# Patient Record
Sex: FEMALE | Race: WHITE | NOT HISPANIC OR LATINO | URBAN - METROPOLITAN AREA
[De-identification: names, ages, dates, MRNs, and addresses within clinical notes are randomized per-mention and may not be internally consistent; named-entity substitution may affect disease eponyms.]

---

## 2017-04-19 ENCOUNTER — OFFICE VISIT (OUTPATIENT)
Dept: ADMISSIONS | Facility: MEDICAL CENTER | Age: 32
End: 2017-04-19
Attending: OBSTETRICS & GYNECOLOGY
Payer: COMMERCIAL

## 2017-04-22 NOTE — H&P
IDENTIFICATION:  This is a 32-year-old  3, para 2 with an EDC of   5/3/2017, EGA of 39 weeks who presents with a chief complaint of previous    x2, desires repeat .    HISTORY OF PRESENT ILLNESS:  This is a patient who was transferred to me from   [facility] with good prenatal care.  Prenatal care has been   uncomplicated.  She did have a beta strep UTI in October, which was treated.    She has otherwise had normal testing, normal ultrasound.  She is Rh positive,   rubella immune.  She declined a quad screen with this pregnancy.  She does   have a history of previous  x2.  She desires repeat  at this   time.  She has no other complaints.  No nausea, vomiting, fever, chills.  No   change in bowel or bladder habits.  Admits to good fetal movement.  Denies   symptoms of PIH, headaches, visual changes or epigastric pain.  Denies   spontaneous rupture of membranes or vaginal bleeding.  She is quite excited to   move forward with repeat low transverse  at this time.    OB HISTORY:  Significant for 2 previous  sections.    GYNECOLOGIC HISTORY:  Denies STDs, abnormal paps.    ALLERGIES:  None.    MEDICAL PROBLEMS:  None.    PAST SURGICAL HISTORY:   x2, laparoscopic cholecystectomy.    SOCIAL HISTORY:  She denies alcohol, tobacco or drug use.    FAMILY HISTORY:  Noncontributory.    REVIEW OF SYSTEMS:  Review of systems x12 is negative per AMA standards   available in chart.    LABORATORY DATA:  She declined her quad screen.  She is Rh positive, rubella   immune, GBS was positive.    PHYSICAL EXAMINATION:  VITAL SIGNS:  Patient is afebrile.  Vital signs within normal limits.  Fetal   heart tracings reactive, category I anticipated, she is not yosef.  GENERAL:  She is awake, alert, in no apparent distress.  NECK:  Supple.  No masses.  HEART:  Regular.  CHEST:  Clear.  BREASTS:  Symmetrical, without masses or discharge.  ABDOMEN:  Soft, pannus,  gravid and size appropriate for dates, nontender.    Positive bowel sounds x4.  EXTREMITIES:  No cyanosis, clubbing, trace edema, 2+ DTRs.  GYNECOLOGIC:  EGBUS within normal limits.  No perineal lesions.  Vagina is   pink and moist.  Cervix is midline without discharge or cervical motion   tenderness.  Uterus midline, anteverted.  No adnexal masses.    ASSESSMENT:  1.  At this time is pregnancy at term.  2.  Previous  x2, desires repeat  section.  3.  Beta strep positive urinary tract infection, treated in early pregnancy.    PLAN:  At this time, patient has been extensively counseled on risks,   benefits, complications, and alternatives to surgery which include but are not   limited to risk of anesthesia, risk of injury to bowel, bladder, ureters,   major vessels, nerves of the pelvis, as well as risk of blood clots in her   legs and lungs and subsequent postop pneumonia.  She accepts these risks and   plan subsequently is repeat low transverse .       ____________________________________     MD EMIL Stubbs / EMANI    DD:  2017 15:17:17  DT:  2017 18:14:43    D#:  [id number]  Job#:  [id number]

## 2017-04-22 NOTE — H&P
IDENTIFICATION:  This is a 32-year-old  3, para 2 with an EDC of   5/3/2017, EGA of 39 weeks who presents with a chief complaint of previous    x2, desires repeat .    HISTORY OF PRESENT ILLNESS:  This is a patient who was transferred to me from   [facility] with good prenatal care.  Prenatal care has been   uncomplicated.  She did have a beta strep UTI in October, which was treated.    She has otherwise had normal testing, normal ultrasound.  She is Rh positive,   rubella immune.  She declined a quad screen with this pregnancy.  She does   have a history of previous  x2.  She desires repeat  at this   time.  She has no other complaints.  No nausea, vomiting, fever, chills.  No   change in bowel or bladder habits.  Admits to good fetal movement.  Denies   symptoms of PIH, headaches, visual changes or epigastric pain.  Denies   spontaneous rupture of membranes or vaginal bleeding.  She is quite excited to   move forward with repeat low transverse  at this time.    OB HISTORY:  Significant for 2 previous  sections.    GYNECOLOGIC HISTORY:  Denies STDs, abnormal paps.    ALLERGIES:  None.    MEDICAL PROBLEMS:  None.    PAST SURGICAL HISTORY:   x2, laparoscopic cholecystectomy.    SOCIAL HISTORY:  She denies alcohol, tobacco or drug use.    FAMILY HISTORY:  Noncontributory.    REVIEW OF SYSTEMS:  Review of systems x12 is negative per AMA standards   available in chart.    LABORATORY DATA:  She declined her quad screen.  She is Rh positive, rubella   immune, GBS was positive.    PHYSICAL EXAMINATION:  VITAL SIGNS:  Patient is afebrile.  Vital signs within normal limits.  Fetal   heart tracings reactive, category I anticipated, she is not yosef.  GENERAL:  She is awake, alert, in no apparent distress.  NECK:  Supple.  No masses.  HEART:  Regular.  CHEST:  Clear.  BREASTS:  Symmetrical, without masses or discharge.  ABDOMEN:  Soft, pannus,  gravid and size appropriate for dates, nontender.    Positive bowel sounds x4.  EXTREMITIES:  No cyanosis, clubbing, trace edema, 2+ DTRs.  GYNECOLOGIC:  EGBUS within normal limits.  No perineal lesions.  Vagina is   pink and moist.  Cervix is midline without discharge or cervical motion   tenderness.  Uterus midline, anteverted.  No adnexal masses.    ASSESSMENT:  1.  At this time is pregnancy at term.  2.  Previous  x2, desires repeat  section.  3.  Beta strep positive urinary tract infection, treated in early pregnancy.    PLAN:  At this time, patient has been extensively counseled on risks,   benefits, complications, and alternatives to surgery which include but are not   limited to risk of anesthesia, risk of injury to bowel, bladder, ureters,   major vessels, nerves of the pelvis, as well as risk of blood clots in her   legs and lungs and subsequent postop pneumonia.  She accepts these risks and   plan subsequently is repeat low transverse .       ____________________________________     MD EMIL Stubbs / EMANI    DD:  2017 15:17:17  DT:  2017 18:14:43    D#:  [id number]  Job#:  [id number]

## 2017-04-28 ENCOUNTER — HOSPITAL ENCOUNTER (INPATIENT)
Facility: MEDICAL CENTER | Age: 32
LOS: 2 days | End: 2017-04-30
Attending: OBSTETRICS & GYNECOLOGY | Admitting: OBSTETRICS & GYNECOLOGY
Payer: COMMERCIAL

## 2017-04-28 LAB
BASOPHILS # BLD AUTO: 0.2 % (ref 0–1.8)
BASOPHILS # BLD: 0.02 K/UL (ref 0–0.12)
EOSINOPHIL # BLD AUTO: 0.09 K/UL (ref 0–0.51)
EOSINOPHIL NFR BLD: 0.8 % (ref 0–6.9)
ERYTHROCYTE [DISTWIDTH] IN BLOOD BY AUTOMATED COUNT: 51.7 FL (ref 35.9–50)
HCT VFR BLD AUTO: 38.4 % (ref 37–47)
HGB BLD-MCNC: 12.3 G/DL (ref 12–16)
HOLDING TUBE BB 8507: NORMAL
IMM GRANULOCYTES # BLD AUTO: 0.06 K/UL (ref 0–0.11)
IMM GRANULOCYTES NFR BLD AUTO: 0.6 % (ref 0–0.9)
LYMPHOCYTES # BLD AUTO: 2 K/UL (ref 1–4.8)
LYMPHOCYTES NFR BLD: 18.9 % (ref 22–41)
MCH RBC QN AUTO: 29.4 PG (ref 27–33)
MCHC RBC AUTO-ENTMCNC: 32 G/DL (ref 33.6–35)
MCV RBC AUTO: 91.9 FL (ref 81.4–97.8)
MONOCYTES # BLD AUTO: 0.6 K/UL (ref 0–0.85)
MONOCYTES NFR BLD AUTO: 5.7 % (ref 0–13.4)
NEUTROPHILS # BLD AUTO: 7.82 K/UL (ref 2–7.15)
NEUTROPHILS NFR BLD: 73.8 % (ref 44–72)
NRBC # BLD AUTO: 0 K/UL
NRBC BLD AUTO-RTO: 0 /100 WBC
PLATELET # BLD AUTO: 166 K/UL (ref 164–446)
PMV BLD AUTO: 12.6 FL (ref 9–12.9)
RBC # BLD AUTO: 4.18 M/UL (ref 4.2–5.4)
WBC # BLD AUTO: 10.6 K/UL (ref 4.8–10.8)

## 2017-04-28 PROCEDURE — 4A1HXCZ MONITORING OF PRODUCTS OF CONCEPTION, CARDIAC RATE, EXTERNAL APPROACH: ICD-10-PCS | Performed by: OBSTETRICS & GYNECOLOGY

## 2017-04-28 PROCEDURE — 770002 HCHG ROOM/CARE - OB PRIVATE (112)

## 2017-04-28 PROCEDURE — 700102 HCHG RX REV CODE 250 W/ 637 OVERRIDE(OP): Performed by: ANESTHESIOLOGY

## 2017-04-28 PROCEDURE — 305385 HCHG SURGICAL SERVICES 1/4 HOUR

## 2017-04-28 PROCEDURE — 700111 HCHG RX REV CODE 636 W/ 250 OVERRIDE (IP): Performed by: OBSTETRICS & GYNECOLOGY

## 2017-04-28 PROCEDURE — 304964 HCHG RECOVERY ROOM TIME 1HR

## 2017-04-28 PROCEDURE — 306288 HCHG RETRACTOR C SECTION LG

## 2017-04-28 PROCEDURE — 36415 COLL VENOUS BLD VENIPUNCTURE: CPT

## 2017-04-28 PROCEDURE — 700111 HCHG RX REV CODE 636 W/ 250 OVERRIDE (IP): Performed by: ANESTHESIOLOGY

## 2017-04-28 PROCEDURE — 302128 INFUSION PUMP W/POLE: Performed by: OBSTETRICS & GYNECOLOGY

## 2017-04-28 PROCEDURE — 85025 COMPLETE CBC W/AUTO DIFF WBC: CPT

## 2017-04-28 PROCEDURE — 306828 HCHG ANES-TIME GENERAL: Performed by: OBSTETRICS & GYNECOLOGY

## 2017-04-28 PROCEDURE — 700102 HCHG RX REV CODE 250 W/ 637 OVERRIDE(OP): Performed by: OBSTETRICS & GYNECOLOGY

## 2017-04-28 PROCEDURE — 700111 HCHG RX REV CODE 636 W/ 250 OVERRIDE (IP)

## 2017-04-28 PROCEDURE — 59514 CESAREAN DELIVERY ONLY: CPT

## 2017-04-28 PROCEDURE — A9270 NON-COVERED ITEM OR SERVICE: HCPCS | Performed by: OBSTETRICS & GYNECOLOGY

## 2017-04-28 PROCEDURE — A9270 NON-COVERED ITEM OR SERVICE: HCPCS | Performed by: ANESTHESIOLOGY

## 2017-04-28 RX ORDER — KETOROLAC TROMETHAMINE 30 MG/ML
30 INJECTION, SOLUTION INTRAMUSCULAR; INTRAVENOUS EVERY 6 HOURS
Status: DISCONTINUED | OUTPATIENT
Start: 2017-04-28 | End: 2017-04-28

## 2017-04-28 RX ORDER — KETOROLAC TROMETHAMINE 30 MG/ML
30 INJECTION, SOLUTION INTRAMUSCULAR; INTRAVENOUS EVERY 6 HOURS
Status: COMPLETED | OUTPATIENT
Start: 2017-04-28 | End: 2017-04-29

## 2017-04-28 RX ORDER — METOCLOPRAMIDE HYDROCHLORIDE 5 MG/ML
10 INJECTION INTRAMUSCULAR; INTRAVENOUS EVERY 6 HOURS PRN
Status: DISCONTINUED | OUTPATIENT
Start: 2017-04-28 | End: 2017-04-29

## 2017-04-28 RX ORDER — CEFAZOLIN SODIUM 1 G/3ML
1 INJECTION, POWDER, FOR SOLUTION INTRAMUSCULAR; INTRAVENOUS ONCE
Status: DISCONTINUED | OUTPATIENT
Start: 2017-04-28 | End: 2017-04-28 | Stop reason: HOSPADM

## 2017-04-28 RX ORDER — ONDANSETRON 2 MG/ML
4 INJECTION INTRAMUSCULAR; INTRAVENOUS ONCE
Status: DISCONTINUED | OUTPATIENT
Start: 2017-04-28 | End: 2017-04-29

## 2017-04-28 RX ORDER — SODIUM CHLORIDE, SODIUM LACTATE, POTASSIUM CHLORIDE, CALCIUM CHLORIDE 600; 310; 30; 20 MG/100ML; MG/100ML; MG/100ML; MG/100ML
INJECTION, SOLUTION INTRAVENOUS CONTINUOUS
Status: DISCONTINUED | OUTPATIENT
Start: 2017-04-28 | End: 2017-04-28 | Stop reason: HOSPADM

## 2017-04-28 RX ORDER — METOCLOPRAMIDE HYDROCHLORIDE 5 MG/ML
10 INJECTION INTRAMUSCULAR; INTRAVENOUS ONCE
Status: COMPLETED | OUTPATIENT
Start: 2017-04-28 | End: 2017-04-28

## 2017-04-28 RX ORDER — ONDANSETRON 2 MG/ML
4 INJECTION INTRAMUSCULAR; INTRAVENOUS EVERY 6 HOURS PRN
Status: DISCONTINUED | OUTPATIENT
Start: 2017-04-28 | End: 2017-04-29

## 2017-04-28 RX ORDER — DIPHENHYDRAMINE HYDROCHLORIDE 50 MG/ML
25 INJECTION INTRAMUSCULAR; INTRAVENOUS EVERY 6 HOURS PRN
Status: DISCONTINUED | OUTPATIENT
Start: 2017-04-28 | End: 2017-04-29

## 2017-04-28 RX ORDER — OXYCODONE HYDROCHLORIDE AND ACETAMINOPHEN 5; 325 MG/1; MG/1
1 TABLET ORAL EVERY 4 HOURS PRN
Status: DISCONTINUED | OUTPATIENT
Start: 2017-04-28 | End: 2017-04-29

## 2017-04-28 RX ORDER — SODIUM CHLORIDE, SODIUM LACTATE, POTASSIUM CHLORIDE, CALCIUM CHLORIDE 600; 310; 30; 20 MG/100ML; MG/100ML; MG/100ML; MG/100ML
INJECTION, SOLUTION INTRAVENOUS PRN
Status: DISCONTINUED | OUTPATIENT
Start: 2017-04-28 | End: 2017-04-30 | Stop reason: HOSPADM

## 2017-04-28 RX ORDER — SIMETHICONE 80 MG
80 TABLET,CHEWABLE ORAL 4 TIMES DAILY PRN
Status: DISCONTINUED | OUTPATIENT
Start: 2017-04-28 | End: 2017-04-30 | Stop reason: HOSPADM

## 2017-04-28 RX ORDER — SODIUM CHLORIDE, SODIUM LACTATE, POTASSIUM CHLORIDE, CALCIUM CHLORIDE 600; 310; 30; 20 MG/100ML; MG/100ML; MG/100ML; MG/100ML
1500 INJECTION, SOLUTION INTRAVENOUS ONCE
Status: COMPLETED | OUTPATIENT
Start: 2017-04-28 | End: 2017-04-28

## 2017-04-28 RX ORDER — DOCUSATE SODIUM 100 MG/1
100 CAPSULE, LIQUID FILLED ORAL 2 TIMES DAILY PRN
Status: DISCONTINUED | OUTPATIENT
Start: 2017-04-28 | End: 2017-04-30 | Stop reason: HOSPADM

## 2017-04-28 RX ORDER — DIPHENHYDRAMINE HYDROCHLORIDE 50 MG/ML
12.5 INJECTION INTRAMUSCULAR; INTRAVENOUS EVERY 6 HOURS PRN
Status: DISCONTINUED | OUTPATIENT
Start: 2017-04-28 | End: 2017-04-29

## 2017-04-28 RX ORDER — OXYCODONE HCL 5 MG/5 ML
10 SOLUTION, ORAL ORAL
Status: COMPLETED | OUTPATIENT
Start: 2017-04-28 | End: 2017-04-28

## 2017-04-28 RX ORDER — OXYCODONE AND ACETAMINOPHEN 10; 325 MG/1; MG/1
1 TABLET ORAL EVERY 4 HOURS PRN
Status: DISCONTINUED | OUTPATIENT
Start: 2017-04-28 | End: 2017-04-29

## 2017-04-28 RX ORDER — KETOROLAC TROMETHAMINE 30 MG/ML
30 INJECTION, SOLUTION INTRAMUSCULAR; INTRAVENOUS EVERY 6 HOURS
Status: DISCONTINUED | OUTPATIENT
Start: 2017-04-29 | End: 2017-04-28

## 2017-04-28 RX ORDER — NALOXONE HYDROCHLORIDE 0.4 MG/ML
0.1 INJECTION, SOLUTION INTRAMUSCULAR; INTRAVENOUS; SUBCUTANEOUS PRN
Status: DISCONTINUED | OUTPATIENT
Start: 2017-04-28 | End: 2017-04-29

## 2017-04-28 RX ORDER — VITAMIN A ACETATE, BETA CAROTENE, ASCORBIC ACID, CHOLECALCIFEROL, .ALPHA.-TOCOPHEROL ACETATE, DL-, THIAMINE MONONITRATE, RIBOFLAVIN, NIACINAMIDE, PYRIDOXINE HYDROCHLORIDE, FOLIC ACID, CYANOCOBALAMIN, CALCIUM CARBONATE, FERROUS FUMARATE, ZINC OXIDE, CUPRIC OXIDE 3080; 12; 120; 400; 1; 1.84; 3; 20; 22; 920; 25; 200; 27; 10; 2 [IU]/1; UG/1; MG/1; [IU]/1; MG/1; MG/1; MG/1; MG/1; MG/1; [IU]/1; MG/1; MG/1; MG/1; MG/1; MG/1
1 TABLET, FILM COATED ORAL EVERY MORNING
Status: DISCONTINUED | OUTPATIENT
Start: 2017-04-28 | End: 2017-04-30 | Stop reason: HOSPADM

## 2017-04-28 RX ORDER — MISOPROSTOL 200 UG/1
600 TABLET ORAL
Status: DISCONTINUED | OUTPATIENT
Start: 2017-04-28 | End: 2017-04-30 | Stop reason: HOSPADM

## 2017-04-28 RX ADMIN — KETOROLAC TROMETHAMINE 30 MG: 30 INJECTION, SOLUTION INTRAMUSCULAR at 14:37

## 2017-04-28 RX ADMIN — SODIUM CITRATE AND CITRIC ACID MONOHYDRATE 30 ML: 500; 334 SOLUTION ORAL at 07:26

## 2017-04-28 RX ADMIN — SODIUM CHLORIDE, POTASSIUM CHLORIDE, SODIUM LACTATE AND CALCIUM CHLORIDE: 600; 310; 30; 20 INJECTION, SOLUTION INTRAVENOUS at 06:43

## 2017-04-28 RX ADMIN — FAMOTIDINE 20 MG: 10 INJECTION, SOLUTION INTRAVENOUS at 07:27

## 2017-04-28 RX ADMIN — OXYCODONE HYDROCHLORIDE AND ACETAMINOPHEN 1 TABLET: 10; 325 TABLET ORAL at 20:48

## 2017-04-28 RX ADMIN — OXYCODONE HYDROCHLORIDE 10 MG: 5 SOLUTION ORAL at 09:40

## 2017-04-28 RX ADMIN — METOCLOPRAMIDE 10 MG: 5 INJECTION, SOLUTION INTRAMUSCULAR; INTRAVENOUS at 07:27

## 2017-04-28 RX ADMIN — Medication 1 TABLET: at 11:48

## 2017-04-28 RX ADMIN — SODIUM CHLORIDE, POTASSIUM CHLORIDE, SODIUM LACTATE AND CALCIUM CHLORIDE 1000 ML: 600; 310; 30; 20 INJECTION, SOLUTION INTRAVENOUS at 07:27

## 2017-04-28 RX ADMIN — HYDROMORPHONE HYDROCHLORIDE 0.4 MG: 1 INJECTION, SOLUTION INTRAMUSCULAR; INTRAVENOUS; SUBCUTANEOUS at 11:47

## 2017-04-28 RX ADMIN — SODIUM CHLORIDE, POTASSIUM CHLORIDE, SODIUM LACTATE AND CALCIUM CHLORIDE: 600; 310; 30; 20 INJECTION, SOLUTION INTRAVENOUS at 06:05

## 2017-04-28 RX ADMIN — KETOROLAC TROMETHAMINE 30 MG: 30 INJECTION, SOLUTION INTRAMUSCULAR at 20:48

## 2017-04-28 ASSESSMENT — PAIN SCALES - GENERAL
PAINLEVEL_OUTOF10: 0
PAINLEVEL_OUTOF10: 0
PAINLEVEL_OUTOF10: 4
PAINLEVEL_OUTOF10: 0
PAINLEVEL_OUTOF10: 4
PAINLEVEL_OUTOF10: 0
PAINLEVEL_OUTOF10: 1
PAINLEVEL_OUTOF10: 4
PAINLEVEL_OUTOF10: 7
PAINLEVEL_OUTOF10: 7

## 2017-04-28 ASSESSMENT — LIFESTYLE VARIABLES
DO YOU DRINK ALCOHOL: NO
EVER_SMOKED: NEVER
ALCOHOL_USE: NO
DO YOU DRINK ALCOHOL: NO

## 2017-04-28 ASSESSMENT — PATIENT HEALTH QUESTIONNAIRE - PHQ9
SUM OF ALL RESPONSES TO PHQ9 QUESTIONS 1 AND 2: 0
2. FEELING DOWN, DEPRESSED, IRRITABLE, OR HOPELESS: NOT AT ALL
1. LITTLE INTEREST OR PLEASURE IN DOING THINGS: NOT AT ALL
SUM OF ALL RESPONSES TO PHQ QUESTIONS 1-9: 0

## 2017-04-28 ASSESSMENT — COPD QUESTIONNAIRES
COPD SCREENING SCORE: 0
HAVE YOU SMOKED AT LEAST 100 CIGARETTES IN YOUR ENTIRE LIFE: NO/DON'T KNOW
DURING THE PAST 4 WEEKS HOW MUCH DID YOU FEEL SHORT OF BREATH: NONE/LITTLE OF THE TIME
DO YOU EVER COUGH UP ANY MUCUS OR PHLEGM?: NO/ONLY WITH OCCASIONAL COLDS OR INFECTIONS

## 2017-04-28 NOTE — IP AVS SNAPSHOT
4/30/2017    Amita Jacome  [address]  [place] [address]    Dear Amita:    [facility] wants to ensure your discharge home is safe and you or your loved ones have had all of your questions answered regarding your care after you leave the hospital.    Below is a list of resources and contact information should you have any questions regarding your hospital stay, follow-up instructions, or active medical symptoms.    Questions or Concerns Regarding… Contact   Medical Questions Related to Your Discharge  (7 days a week, 8am-5pm) Contact a Nurse Care Coordinator   [phone]   Medical Questions Not Related to Your Discharge  (24 hours a day / 7 days a week)  Contact the Nurse Health Line   [phone]    Medications or Discharge Instructions Refer to your discharge packet   or contact your [facility] Primary Care Provider   [phone]   Follow-up Appointment(s) Schedule your appointment via [facility]   or contact Scheduling [phone]   Billing Review your statement via [employer]  or contact Billing [phone]   Medical Records Review your records via Her[employer]   or contact Medical Records [phone]     You may receive a telephone call within two days of discharge. This call is to make certain you understand your discharge instructions and have the opportunity to have any questions answered. You can also easily access your medical information, test results and upcoming appointments via the [facility] free online health management tool. You can learn more and sign up at [email or web address]. For assistance setting up your Her[facility] account, please call [phone].    Once again, we want to ensure your discharge home is safe and that you have a clear understanding of any next steps in your care. If you have any questions or concerns, please do not hesitate to contact us, we are here for you. Thank you for choosing [facility] for your healthcare needs.    Sincerely,    Your [facility] Healthcare Team

## 2017-04-28 NOTE — OP REPORT
DATE OF SERVICE:  2017    PREOPERATIVE DIAGNOSES:  1.  Pregnancy at term.  2.  Previous .  3.  Desires repeat  section.    POSTOPERATIVE DIAGNOSES:  1.  Pregnancy at term.  2.  Previous .  3.  Desires repeat  section.  4.  Double nuchal cord loose.    PROCEDURE:  Repeat low transverse .    SURGEON:  Robert Fritz MD    ASSISTANT:  Dr. May.    ANESTHESIOLOGIST:  Dayday Carias MD    ANESTHESIA:  Spinal.    SPECIMENS:  Placenta.    ESTIMATED BLOOD LOSS:  550 mL.    FINDINGS:  1.  Normal appearing uterus, tubes, ovaries.  2.  Male infant with Apgars of 8 and 9 and loose double nuchal cord.    COMPLICATIONS:  None.    DISPOSITION:  Stable.    PROCEDURE IN DETAIL:  After informed consent was obtained, the patient was   taken to the operating room where spinal anesthesia was found to be adequate.    She was then prepped and draped in normal sterile fashion in dorsal supine   position with leftward tilt.  Pfannenstiel skin incision was made through her   previous incision, carried down to the underlying layer of fascia with the   Bovie.  The fascia was then nicked in the midline and the fascial incision was   extended laterally with the Bovie.  The inferior layer of fascial incision   was then grasped with 2 Kocher clamps, tented up, and the underlying layer of   rectus muscle dissected off with the Bovie.  Attention was then turned to the   superior layer of fascial incision, which in a similar fashion was grasped   with 2 Kocher clamps, tented up, and the underlying layer of rectus muscles   were dissected off with Bovie.  The rectus muscles were  in the   midline.  The peritoneum identified, tented up and entered sharply with a   scalpel.  The peritoneal incision was extended superiorly and inferiorly with   good visualization of the bladder.  The uterus was then examined for position.    An Rogelio O was placed.  The bowel swept away.  The Rogelio O was  secured.  A   bladder flap was created with Metzenbaums.  The uterus was then incised in a   low U-shaped fashion with a scalpel.  The uterine incision was extended   laterally bluntly.  Clear fluid was noted.  The head was delivered   atraumatically.  The nares and mouth were bulb suctioned.  Loose double nuchal   was reduced.  The shoulders and body followed without complication.  The cord   was clamped and cut.  Cord gases were collected and held.  The infant was   handed off to awaiting respiratory team.  At this point, the placenta   delivered intact with 3-vessel cord and trailing membranes were gently teased   out.  The uterus was cleared of all clots and debris.  The uterine incision   was closed with 0 Chromic in a running locking fashion.  Excellent hemostasis   was noted.  Tubes and ovaries were noted to be normal bilaterally.  Again,   irrigation was performed and hemostasis was assured.  The Rogelio O was   removed.  Two omental adhesions were taken down with the Bovie.  Hemostasis   was assured.  The peritoneum was closed with 2-0 Vicryl in a running fashion.    Irrigation was performed and hemostasis was assured.  The fascia was closed   with 0 Vicryl in a running fashion.  Irrigation was performed and hemostasis   was assured.  The subcutaneous fat was reapproximated with a running stitch of   3-0 Vicryl.  The skin was closed with 4-0 Vicryl in a subcuticular stitch.    Patient tolerated the procedure well.  Sponge, lap and needle counts were   correct x3.  Patient and baby were taken to recovery room in stable condition.       ____________________________________     MD EMIL Stubbs / EMANI    DD:  04/28/2017 08:36:40  DT:  04/28/2017 08:56:08    D#:  [id number]  Job#:  [id number]

## 2017-04-28 NOTE — IP AVS SNAPSHOT
Home Care Instructions                                                                                                                Amita Jacome   MRN: [id number]    Department:  POST PARTUM [facility]   2017           Follow-up Information     1. Follow up with Robert Lindsey M.D. In 2 weeks.    Specialty:  OB/Gyn    Contact information    [address]  [facility] [address]  [place] [address]  [phone]         I assume responsibility for securing a follow-up  Screening blood test on my baby within the specified date range.    -                  Discharge Instructions       POSTPARTUM DISCHARGE INSTRUCTIONS FOR MOM    YOB: 1985   Age: 32 y.o.               Admit Date: 2017     Discharge Date: 2017  Attending Doctor:  Robert Lindsey M.D.                  Allergies:  [place]    Discharged to home by car. Discharged via wheelchair, hospital escort: Yes.  Special equipment needed: Not Applicable  Belongings with: Personal  Be sure to schedule a follow-up appointment with your primary care doctor or any specialists as instructed.     Discharge Plan:   Diet Plan: Discussed  Activity Level: Discussed  Confirmed Follow up Appointment: Appointment Scheduled  Confirmed Symptoms Management: Discussed  Medication Reconciliation Updated: Yes  Influenza Vaccine Indication: Indicated: Not available from distributor/    REASONS TO CALL YOUR OBSTETRICIAN:  1.   Persistent fever or shaking chills (Temperature higher than 100.4)  2.   Heavy bleeding (soaking more than 1 pad per hour); Passing clots  3.   Foul odor from vagina  4.   Mastitis (Breast infection; breast pain, chills, fever, redness)  5.   Urinary pain, burning or frequency  6.   Episiotomy infection  7.   Abdominal incision infection  8.   Severe depression longer than 24 hours    HAND WASHING  · Prior to handling the baby.  · Before breastfeeding or bottle feeding baby.  · After using the bathroom or changing the baby's  "diaper.    WOUND CARE  Ask your physician for additional care instructions.  In general:    ·  Incision:      · Keep clean and dry.    · Do NOT lift anything heavier than your baby for up to 6 weeks.    · There should not be any opening or pus.      VAGINAL CARE  · Nothing inside vagina for 6 weeks: no sexual intercourse, tampons or douching.  · Bleeding may continue for 2-4 weeks.  Amount may vary.    · Call your physician for heavy bleeding which means soaking more than 1 pad per hour    BIRTH CONTROL  · It is possible to become pregnant at any time after delivery and while breastfeeding.  · Plan to discuss a method of birth control with your physician at your follow up visit. visit.    DIET AND ELIMINATION  · Eating more fiber (bran cereal, fruits, and vegetables) and drinking plenty of fluids will help to avoid constipation.  · Urinary frequency after childbirth is normal.    POSTPARTUM BLUES  During the first few days after birth, you may experience a sense of the \"blues\" which may include impatience, irritability or even crying.  These feeling come and go quickly.  However, as many as 1 in 10 women experience emotional symptoms known as postpartum depression.    Postpartum depression:  May start as early as the second or third day after delivery or take several weeks or months to develop.  Symptoms of \"blues\" are present, but are more intense:  Crying spells; loss of appetite; feelings of hopelessness or loss of control; fear of touching the baby; over concern or no concern at all about the baby; little or no concern about your own appearance/caring for yourself; and/or inability to sleep or excessive sleeping.  Contact your physician if you are experiencing any of these symptoms.    Crisis Hotline:  · [place] Crisis Hotline:  4-734-YEPCHKJ  Or 1-985.101.2162  · [place] Crisis Hotline:  1-739.230.4517  Or 399-639-9048    PREVENTING SHAKEN BABY:  If you are angry or stressed, PUT THE BABY IN THE CRIB, " "step away, take some deep breaths, and wait until you are calm to care for the baby.  DO NOT SHAKE THE BABY.  You are not alone, call a supporter for help.    · Crisis Call Center 24/7 crisis line 468-038-2861 or 1-530.313.5589  · You can also text them, text \"ANSWER\" to 740155    QUIT SMOKING/TOBACCO USE:  I understand the use of any tobacco products increases my chance of suffering from future heart disease and could cause other illnesses which may shorten my life. Quitting the use of tobacco products is the single most important thing I can do to improve my health. For further information on smoking / tobacco cessation call a Toll Free Quit Line at 1-907.692.4030 (*National Cancer [employer]essel) or 1-394.420.3652 (American Lung Association) or you can access the web based program at www.lungusa.org.    · [place] Tobacco Users Help Line:  (603) 308-2256       Toll Free: 1-709.268.9567  · Quit Tobacco Program [facility] (005)159-4297    DEPRESSION / SUICIDE RISK:  As you are discharged from this [facility], it is important to learn how to keep safe from harming yourself.    Recognize the warning signs:  · Abrupt changes in personality, positive or negative- including increase in energy   · Giving away possessions  · Change in eating patterns- significant weight changes-  positive or negative  · Change in sleeping patterns- unable to sleep or sleeping all the time   · Unwillingness or inability to communicate  · Depression  · Unusual sadness, discouragement and loneliness  · Talk of wanting to die  · Neglect of personal appearance   · Rebelliousness- reckless behavior  · Withdrawal from people/activities they love  · Confusion- inability to concentrate     If you or a loved one observes any of these behaviors or has concerns about self-harm, here's what you can do:  · Talk about it- your feelings and reasons for harming yourself  · Remove any means that you might use to hurt yourself " (examples: pills, rope, extension cords, firearm)  · Get professional help from the community (Mental Health, Substance Abuse, psychological counseling)  · Do not be alone:Call your Safe Contact- someone whom you trust who will be there for you.  · Call your local CRISIS HOTLINE 519-8328 or 480-499-6832  · Call your local Children's Mobile Crisis Response Team [place] (231) 979-8071 or www.[employer]  · Call the toll free National Suicide Prevention Hotlines   · National Suicide Prevention Lifeline 155-555-SIQB (3730)  · National Hope Line Network 800-SUICIDE (820-1362)    DISCHARGE SURVEY:  Thank you for choosing [facility].  We hope we provided you with very good care.  You may be receiving a survey in the mail.  Please fill it out.  Your opinion is valuable to us.    ADDITIONAL EDUCATIONAL MATERIALS GIVEN TO PATIENT:        My signature on this form indicates that:  1.  I have reviewed and understand the above information  2.  My questions regarding this information have been answered to my satisfaction.  3.  I have formulated a plan with my discharge nurse to obtain my prescribed medication for home.         Discharge Medication Instructions:    Below are the medications your physician expects you to take upon discharge:    Review all your home medications and newly ordered medications with your doctor and/or pharmacist. Follow medication instructions as directed by your doctor and/or pharmacist.    Please keep your medication list with you and share with your physician.               Medication List      START taking these medications        Instructions    Morning Afternoon Evening Bedtime     MG Caps   Last time this was given:  100 mg on 4/30/2017  1:31 AM        Take 100 mg by mouth 2 times a day as needed for Constipation.   Dose:  100 mg                        ibuprofen 600 MG Tabs   Last time this was given:  600 mg on 4/30/2017  5:34 AM   Commonly known as:  MOTRIN        Take 1  Tab by mouth every 6 hours as needed (For cramping after delivery; do not give if patient is receiving ketorolac (Toradol)).   Dose:  600 mg                        oxycodone-acetaminophen 5-325 MG Tabs   Last time this was given:  1 Tab on 4/29/2017  4:48 PM   Commonly known as:  PERCOCET        Take 1 Tab by mouth every four hours as needed (for Moderate Pain (Pain Scale 4-6) after delivery).   Dose:  1 Tab                          CONTINUE taking these medications        Instructions    Morning Afternoon Evening Bedtime    ONE-A-DAY WOMENS PRENATAL PO        Take 1 Tab by mouth every day.   Dose:  1 Tab                             Where to Get Your Medications      Information about where to get these medications is not yet available     ! Ask your nurse or doctor about these medications    -  MG Caps  - ibuprofen 600 MG Tabs  - oxycodone-acetaminophen 5-325 MG Tabs            Crisis Hotline:     [place] Crisis Hotline:  8-774-CGSDIVN or 1-583.352.2170    [place] Crisis Hotline:    1-537.983.1955 or 159-482-9724        Disclaimer           _____________________________________                     __________       ________       Patient/Mother Signature or Legal                          Date                   Time

## 2017-04-28 NOTE — OR SURGEON
Immediate Post-Operative Note      PreOp Diagnosis: iup at term; prev csx; desired rltcs    PostOp Diagnosis: same and double nuchal    Procedure(s):  REPEAT C SECTION - Wound Class: Clean Contaminated    Surgeon(s):  YUNG Randolph M.D.    Anesthesiologist/Type of Anesthesia:  Anesthesiologist: Dayday Carias M.D./Spinal    Surgical Staff:  Circulator: Indiana Pabon R.N.  Scrub Person: Jae Berumen  L&RADHA Baby  Nurse: Lizzy Panda R.N.    Specimen: placentae    Estimated Blood Loss: 550      Findings: wnl u/t/ov; male 8/9 double nuchal loose    Complications: none        4/28/2017 8:32 AM Robert Lindsey

## 2017-04-28 NOTE — IP AVS SNAPSHOT
Novawise Access Code: [email or web address]  Expires: 5/19/2017 11:18 AM    Novawise  A secure, online tool to manage your health information     [employer]’s Novawise® is a secure, online tool that connects you to your personalized health information from the privacy of your home -- day or night - making it very easy for you to manage your healthcare. Once the activation process is completed, you can even access your medical information using the Novawise elizabeth, which is available for free in the Apple Elizabeth store or Google Play store.     Novawise provides the following levels of access (as shown below):   My Chart Features   [facility] Primary Care Doctor [facility]  Specialists [facility]  Urgent  Care Non-[facility]  Primary Care  Doctor   Email your healthcare team securely and privately 24/7 X X X X   Manage appointments: schedule your next appointment; view details of past/upcoming appointments X      Request prescription refills. X      View recent personal medical records, including lab and immunizations X X X X   View health record, including health history, allergies, medications X X X X   Read reports about your outpatient visits, procedures, consult and ER notes X X X X   See your discharge summary, which is a recap of your hospital and/or ER visit that includes your diagnosis, lab results, and care plan. X X       How to register for Novawise:  1. Go to  [email or web address].  2. Click on the Sign Up Now box, which takes you to the New Member Sign Up page. You will need to provide the following information:  a. Enter your Novawise Access Code exactly as it appears at the top of this page. (You will not need to use this code after you’ve completed the sign-up process. If you do not sign up before the expiration date, you must request a new code.)   b. Enter your date of birth.   c. Enter your home email address.   d. Click Submit, and follow the next screen’s instructions.  3. Create a Novawise ID. This will be your Novawise  login ID and cannot be changed, so think of one that is secure and easy to remember.  4. Create a [employer] password. You can change your password at any time.  5. Enter your Password Reset Question and Answer. This can be used at a later time if you forget your password.   6. Enter your e-mail address. This allows you to receive e-mail notifications when new information is available in [employer].  7. Click Sign Up. You can now view your health information.    For assistance activating your [employer] account, call [phone]

## 2017-04-29 LAB
ERYTHROCYTE [DISTWIDTH] IN BLOOD BY AUTOMATED COUNT: 52.5 FL (ref 35.9–50)
HCT VFR BLD AUTO: 31.9 % (ref 37–47)
HGB BLD-MCNC: 10 G/DL (ref 12–16)
MCH RBC QN AUTO: 29.4 PG (ref 27–33)
MCHC RBC AUTO-ENTMCNC: 31.3 G/DL (ref 33.6–35)
MCV RBC AUTO: 93.8 FL (ref 81.4–97.8)
PLATELET # BLD AUTO: 122 K/UL (ref 164–446)
PMV BLD AUTO: 12.2 FL (ref 9–12.9)
RBC # BLD AUTO: 3.4 M/UL (ref 4.2–5.4)
WBC # BLD AUTO: 11.9 K/UL (ref 4.8–10.8)

## 2017-04-29 PROCEDURE — 36415 COLL VENOUS BLD VENIPUNCTURE: CPT

## 2017-04-29 PROCEDURE — 700112 HCHG RX REV CODE 229: Performed by: OBSTETRICS & GYNECOLOGY

## 2017-04-29 PROCEDURE — A9270 NON-COVERED ITEM OR SERVICE: HCPCS | Performed by: OBSTETRICS & GYNECOLOGY

## 2017-04-29 PROCEDURE — 700102 HCHG RX REV CODE 250 W/ 637 OVERRIDE(OP): Performed by: ANESTHESIOLOGY

## 2017-04-29 PROCEDURE — 770002 HCHG ROOM/CARE - OB PRIVATE (112)

## 2017-04-29 PROCEDURE — A9270 NON-COVERED ITEM OR SERVICE: HCPCS | Performed by: ANESTHESIOLOGY

## 2017-04-29 PROCEDURE — 700111 HCHG RX REV CODE 636 W/ 250 OVERRIDE (IP): Performed by: ANESTHESIOLOGY

## 2017-04-29 PROCEDURE — 700102 HCHG RX REV CODE 250 W/ 637 OVERRIDE(OP): Performed by: OBSTETRICS & GYNECOLOGY

## 2017-04-29 PROCEDURE — 85027 COMPLETE CBC AUTOMATED: CPT

## 2017-04-29 RX ORDER — KETOROLAC TROMETHAMINE 30 MG/ML
30 INJECTION, SOLUTION INTRAMUSCULAR; INTRAVENOUS EVERY 6 HOURS
Status: DISCONTINUED | OUTPATIENT
Start: 2017-04-29 | End: 2017-04-29 | Stop reason: ALTCHOICE

## 2017-04-29 RX ORDER — IBUPROFEN 600 MG/1
600 TABLET ORAL EVERY 6 HOURS PRN
Status: DISCONTINUED | OUTPATIENT
Start: 2017-04-29 | End: 2017-04-30 | Stop reason: HOSPADM

## 2017-04-29 RX ORDER — IBUPROFEN 600 MG/1
600 TABLET ORAL EVERY 6 HOURS PRN
Status: DISCONTINUED | OUTPATIENT
Start: 2017-04-30 | End: 2017-04-29

## 2017-04-29 RX ORDER — MORPHINE SULFATE 4 MG/ML
4 INJECTION, SOLUTION INTRAMUSCULAR; INTRAVENOUS
Status: DISCONTINUED | OUTPATIENT
Start: 2017-04-29 | End: 2017-04-30 | Stop reason: HOSPADM

## 2017-04-29 RX ORDER — OXYCODONE HYDROCHLORIDE AND ACETAMINOPHEN 5; 325 MG/1; MG/1
1 TABLET ORAL EVERY 4 HOURS PRN
Status: DISCONTINUED | OUTPATIENT
Start: 2017-04-29 | End: 2017-04-30 | Stop reason: HOSPADM

## 2017-04-29 RX ORDER — DIPHENHYDRAMINE HYDROCHLORIDE 50 MG/ML
25 INJECTION INTRAMUSCULAR; INTRAVENOUS EVERY 6 HOURS PRN
Status: DISCONTINUED | OUTPATIENT
Start: 2017-04-29 | End: 2017-04-30 | Stop reason: HOSPADM

## 2017-04-29 RX ORDER — ONDANSETRON 4 MG/1
4 TABLET, ORALLY DISINTEGRATING ORAL EVERY 6 HOURS PRN
Status: DISCONTINUED | OUTPATIENT
Start: 2017-04-29 | End: 2017-04-30 | Stop reason: HOSPADM

## 2017-04-29 RX ORDER — ACETAMINOPHEN 325 MG/1
325 TABLET ORAL EVERY 4 HOURS PRN
Status: DISCONTINUED | OUTPATIENT
Start: 2017-04-29 | End: 2017-04-30 | Stop reason: HOSPADM

## 2017-04-29 RX ORDER — OXYCODONE AND ACETAMINOPHEN 10; 325 MG/1; MG/1
1 TABLET ORAL EVERY 4 HOURS PRN
Status: DISCONTINUED | OUTPATIENT
Start: 2017-04-29 | End: 2017-04-30 | Stop reason: HOSPADM

## 2017-04-29 RX ORDER — ONDANSETRON 2 MG/ML
4 INJECTION INTRAMUSCULAR; INTRAVENOUS EVERY 6 HOURS PRN
Status: DISCONTINUED | OUTPATIENT
Start: 2017-04-29 | End: 2017-04-30 | Stop reason: HOSPADM

## 2017-04-29 RX ORDER — DIPHENHYDRAMINE HCL 25 MG
25 TABLET ORAL EVERY 6 HOURS PRN
Status: DISCONTINUED | OUTPATIENT
Start: 2017-04-29 | End: 2017-04-30 | Stop reason: HOSPADM

## 2017-04-29 RX ADMIN — IBUPROFEN 600 MG: 600 TABLET, FILM COATED ORAL at 23:07

## 2017-04-29 RX ADMIN — OXYCODONE HYDROCHLORIDE AND ACETAMINOPHEN 1 TABLET: 5; 325 TABLET ORAL at 16:48

## 2017-04-29 RX ADMIN — DOCUSATE SODIUM 100 MG: 100 CAPSULE ORAL at 10:04

## 2017-04-29 RX ADMIN — OXYCODONE HYDROCHLORIDE AND ACETAMINOPHEN 1 TABLET: 10; 325 TABLET ORAL at 02:23

## 2017-04-29 RX ADMIN — IBUPROFEN 600 MG: 600 TABLET, FILM COATED ORAL at 16:47

## 2017-04-29 RX ADMIN — Medication 1 TABLET: at 10:04

## 2017-04-29 RX ADMIN — OXYCODONE HYDROCHLORIDE AND ACETAMINOPHEN 1 TABLET: 10; 325 TABLET ORAL at 21:29

## 2017-04-29 RX ADMIN — KETOROLAC TROMETHAMINE 30 MG: 30 INJECTION, SOLUTION INTRAMUSCULAR at 02:23

## 2017-04-29 RX ADMIN — IBUPROFEN 600 MG: 600 TABLET, FILM COATED ORAL at 10:04

## 2017-04-29 RX ADMIN — OXYCODONE HYDROCHLORIDE AND ACETAMINOPHEN 1 TABLET: 5; 325 TABLET ORAL at 10:04

## 2017-04-29 ASSESSMENT — PAIN SCALES - GENERAL
PAINLEVEL_OUTOF10: 0
PAINLEVEL_OUTOF10: 7
PAINLEVEL_OUTOF10: 5
PAINLEVEL_OUTOF10: 7
PAINLEVEL_OUTOF10: 5
PAINLEVEL_OUTOF10: 6
PAINLEVEL_OUTOF10: 6
PAINLEVEL_OUTOF10: 5

## 2017-04-29 NOTE — PROGRESS NOTES
Report received at 0700. Assessment completed: Abdominal incision dressing cdi. Patient ambulates by self. Fundus firm, lochia scant rubra. Patient states pain 6/10; medicated per MAR. Patient would like medication offered when available. Bed in lowest locked position. Call light in place. All questions and concerns addressed. Will continue to monitor.

## 2017-04-29 NOTE — PROGRESS NOTES
POD # 1 S/P RLTCS    Doing well.  Pain controlled.  Voiding, ambulating, breast feeding.  Tolerating regular diet.  + Flatus.  Denies heavy bleeding.    AF, VS wnl    AAO, NAD  Abd:  S/NT/ND; fundus firm; dressing clean an dry  Ext:  Calves NT, no edema    Hct: 32  Rh: positive  Rubella: immune    Continue post-op and postpartum care.

## 2017-04-29 NOTE — PROGRESS NOTES
Received bedside report from TERRENCE Vo. Discussed plan of care. Patient will call for pain medication as needed. All needs met at this time.

## 2017-04-29 NOTE — PROGRESS NOTES
1000- Patient arrived to Room S351.  Report received from TERRENCE Be.  Patient assessment done.  IV patent.  Sequential stockings on.  Abdominal dressing is clean, dry, and intact.  Indwelling catheter to gravity.  Discussed pain management with patient. Patient prefers to be medicated as it is available but will call for narcotic pain medication as needed.  Patient oriented to room, call system, infant security, and Skylight System.  Reviewed plan of care.  FOB at bedside.  1700- Patient up to bathroom with standby assist.  Eliz-care done.

## 2017-04-29 NOTE — CONSULTS
Mother reports baby BF often for short periods, baby has voided and stooled, mother states supplemented once with DBM, informed that if continues to feel need to supplement DBM will also need to initiate pumping, educated on outpatient BF resources and encouraged to call for ongoing assistance as needed.

## 2017-04-29 NOTE — CARE PLAN
Problem: Altered physiologic condition related to postoperative  delivery  Goal: Patient physiologically stable as evidenced by normal lochia, palpable uterine involution and vital signs within normal limits  Outcome: PROGRESSING AS EXPECTED  Patient is stable. Lochia light. Fundus firm. Vital signs are within defined limits.     Problem: Potential for postpartum infection related to surgical incision, compromised uterine condition, urinary tract or respiratory compromise  Goal: Patient will be afebrile and free from signs and symptoms of infection  Outcome: PROGRESSING AS EXPECTED  Vital signs are within normal limits. No signs or symptoms of infection noted.

## 2017-04-30 VITALS
HEART RATE: 82 BPM | RESPIRATION RATE: 20 BRPM | TEMPERATURE: 98 F | DIASTOLIC BLOOD PRESSURE: 70 MMHG | OXYGEN SATURATION: 97 % | HEIGHT: 65 IN | BODY MASS INDEX: 42.49 KG/M2 | WEIGHT: 255 LBS | SYSTOLIC BLOOD PRESSURE: 113 MMHG

## 2017-04-30 PROCEDURE — 700112 HCHG RX REV CODE 229: Performed by: OBSTETRICS & GYNECOLOGY

## 2017-04-30 PROCEDURE — 700102 HCHG RX REV CODE 250 W/ 637 OVERRIDE(OP): Performed by: OBSTETRICS & GYNECOLOGY

## 2017-04-30 PROCEDURE — A6250 SKIN SEAL PROTECT MOISTURIZR: HCPCS | Performed by: OBSTETRICS & GYNECOLOGY

## 2017-04-30 PROCEDURE — A9270 NON-COVERED ITEM OR SERVICE: HCPCS | Performed by: OBSTETRICS & GYNECOLOGY

## 2017-04-30 RX ORDER — PSEUDOEPHEDRINE HCL 30 MG
100 TABLET ORAL 2 TIMES DAILY PRN
Qty: 60 CAP | Refills: 0 | Status: SHIPPED | OUTPATIENT
Start: 2017-04-30

## 2017-04-30 RX ORDER — OXYCODONE HYDROCHLORIDE AND ACETAMINOPHEN 5; 325 MG/1; MG/1
1 TABLET ORAL EVERY 4 HOURS PRN
Qty: 25 TAB | Refills: 0 | Status: SHIPPED | OUTPATIENT
Start: 2017-04-30 | End: 2019-02-28

## 2017-04-30 RX ORDER — IBUPROFEN 600 MG/1
600 TABLET ORAL EVERY 6 HOURS PRN
Qty: 30 TAB | Refills: 0 | Status: SHIPPED | OUTPATIENT
Start: 2017-04-30

## 2017-04-30 RX ADMIN — OXYCODONE HYDROCHLORIDE AND ACETAMINOPHEN 1 TABLET: 10; 325 TABLET ORAL at 05:34

## 2017-04-30 RX ADMIN — OXYCODONE HYDROCHLORIDE AND ACETAMINOPHEN 1 TABLET: 10; 325 TABLET ORAL at 01:31

## 2017-04-30 RX ADMIN — IBUPROFEN 600 MG: 600 TABLET, FILM COATED ORAL at 05:34

## 2017-04-30 RX ADMIN — DOCUSATE SODIUM 100 MG: 100 CAPSULE ORAL at 01:31

## 2017-04-30 ASSESSMENT — PAIN SCALES - GENERAL
PAINLEVEL_OUTOF10: 3
PAINLEVEL_OUTOF10: 7
PAINLEVEL_OUTOF10: 4
PAINLEVEL_OUTOF10: 3
PAINLEVEL_OUTOF10: 7

## 2017-04-30 NOTE — DISCHARGE SUMMARY
Discharge Summary:      Amita Jacome      Admit Date:   2017  Discharge Date:  2017     Admitting diagnosis:  Pregnancy  PREVIOUS , 39 WEEKS GESTATION  Indication for care in labor or delivery  Discharge Diagnosis: Status post  for repeat.  Pregnancy Complications: none  Tubal Ligation:  no        History:  History reviewed. No pertinent past medical history.  OB History    Para Term  AB SAB TAB Ectopic Multiple Living   3 2        2      # Outcome Date GA Lbr Tomasz/2nd Weight Sex Delivery Anes PTL Lv   3             2 Para            1 Para                    [place]  There are no active problems to display for this patient.       Hospital Course:   32 y.o. , now para 3, was admitted with the above mentioned diagnosis, underwent Repeat  repeat. Patient postpartum course was unremarkable, with progressive advancement in diet , ambulation and toleration of oral analgesia. Patient without complaints today and desires discharge.      Filed Vitals:    17 0400 17 0800 17 2129 17 0752   BP: 110/61 115/67 108/80 113/70   Pulse: 86 83 90 82   Temp: 36.3 °C (97.4 °F) 36.5 °C (97.7 °F) 36.4 °C (97.6 °F) 36.7 °C (98 °F)   Resp: 17 16 18 20   Height:       Weight:       SpO2: 98% 96% 95% 97%       Current Facility-Administered Medications   Medication Dose   • acetaminophen (TYLENOL) tablet 325 mg  325 mg   • oxycodone-acetaminophen (PERCOCET) 5-325 MG per tablet 1 Tab  1 Tab   • oxycodone-acetaminophen (PERCOCET-10)  MG per tablet 1 Tab  1 Tab   • morphine (pf) 4 mg/ml injection 4 mg  4 mg   • ondansetron (ZOFRAN) syringe/vial injection 4 mg  4 mg    Or   • ondansetron (ZOFRAN ODT) dispertab 4 mg  4 mg   • diphenhydrAMINE (BENADRYL) tablet/capsule 25 mg  25 mg    Or   • diphenhydrAMINE (BENADRYL) injection 25 mg  25 mg   • ibuprofen (MOTRIN) tablet 600 mg  600 mg   • oxytocin (PITOCIN) infusion (for postpartum)   mL/hr   • LR  infusion     • misoprostol (CYTOTEC) tablet 600 mcg  600 mcg   • docusate sodium (COLACE) capsule 100 mg  100 mg   • simethicone (MYLICON) chewable tab 80 mg  80 mg   • prenatal plus vitamin (STUARTNATAL 1+1) 27-1 MG tablet 1 Tab  1 Tab       Exam:  Gen: NAD, AAO  Abdomen: Abdomen soft, non-tender. BS normal. No masses,  No organomegal, FF @ U-2.  Fundus Tender: no  Incision: C/D/I suture intact  Extremity: 1+ edema, redness or tenderness in the calves or thighs, 2+DPP, Homans sign is negative, no sign of DVT       Labs:  Recent Labs      04/28/17   0610  04/29/17   0412   WBC  10.6  11.9*   RBC  4.18*  3.40*   HEMOGLOBIN  12.3  10.0*   HEMATOCRIT  38.4  31.9*   MCV  91.9  93.8   MCH  29.4  29.4   MCHC  32.0*  31.3*   RDW  51.7*  52.5*   PLATELETCT  166  122*   MPV  12.6  12.2        Activity:   Discharge to home  Pelvic Rest x 6 weeks    Assessment:  normal postpartum course  Discharge Assessment: No heavy bleeding or foul vaginal discharge      Follow up: 2wk for incision check with Robert Lindsey M.D.       Discharge Meds:   Current Outpatient Prescriptions   Medication Sig Dispense Refill   • oxycodone-acetaminophen (PERCOCET) 5-325 MG Tab Take 1 Tab by mouth every four hours as needed (for Moderate Pain (Pain Scale 4-6) after delivery). 25 Tab 0   • ibuprofen (MOTRIN) 600 MG Tab Take 1 Tab by mouth every 6 hours as needed (For cramping after delivery; do not give if patient is receiving ketorolac (Toradol)). 30 Tab 0   • docusate sodium 100 MG Cap Take 100 mg by mouth 2 times a day as needed for Constipation. 60 Cap 0       Eduardo Dumont M.D.

## 2017-04-30 NOTE — DISCHARGE INSTRUCTIONS
POSTPARTUM DISCHARGE INSTRUCTIONS FOR MOM    YOB: 1985   Age: 32 y.o.               Admit Date: 2017     Discharge Date: 2017  Attending Doctor:  Robert Lindsey M.D.                  Allergies:  Knob Noster    Discharged to home by car. Discharged via wheelchair, hospital escort: Yes.  Special equipment needed: Not Applicable  Belongings with: Personal  Be sure to schedule a follow-up appointment with your primary care doctor or any specialists as instructed.     Discharge Plan:   Diet Plan: Discussed  Activity Level: Discussed  Confirmed Follow up Appointment: Appointment Scheduled  Confirmed Symptoms Management: Discussed  Medication Reconciliation Updated: Yes  Influenza Vaccine Indication: Indicated: Not available from distributor/    REASONS TO CALL YOUR OBSTETRICIAN:  1.   Persistent fever or shaking chills (Temperature higher than 100.4)  2.   Heavy bleeding (soaking more than 1 pad per hour); Passing clots  3.   Foul odor from vagina  4.   Mastitis (Breast infection; breast pain, chills, fever, redness)  5.   Urinary pain, burning or frequency  6.   Episiotomy infection  7.   Abdominal incision infection  8.   Severe depression longer than 24 hours    HAND WASHING  · Prior to handling the baby.  · Before breastfeeding or bottle feeding baby.  · After using the bathroom or changing the baby's diaper.    WOUND CARE  Ask your physician for additional care instructions.  In general:    ·  Incision:      · Keep clean and dry.    · Do NOT lift anything heavier than your baby for up to 6 weeks.    · There should not be any opening or pus.      VAGINAL CARE  · Nothing inside vagina for 6 weeks: no sexual intercourse, tampons or douching.  · Bleeding may continue for 2-4 weeks.  Amount may vary.    · Call your physician for heavy bleeding which means soaking more than 1 pad per hour    BIRTH CONTROL  · It is possible to become pregnant at any time after delivery and while  "breastfeeding.  · Plan to discuss a method of birth control with your physician at your follow up visit. visit.    DIET AND ELIMINATION  · Eating more fiber (bran cereal, fruits, and vegetables) and drinking plenty of fluids will help to avoid constipation.  · Urinary frequency after childbirth is normal.    POSTPARTUM BLUES  During the first few days after birth, you may experience a sense of the \"blues\" which may include impatience, irritability or even crying.  These feeling come and go quickly.  However, as many as 1 in 10 women experience emotional symptoms known as postpartum depression.    Postpartum depression:  May start as early as the second or third day after delivery or take several weeks or months to develop.  Symptoms of \"blues\" are present, but are more intense:  Crying spells; loss of appetite; feelings of hopelessness or loss of control; fear of touching the baby; over concern or no concern at all about the baby; little or no concern about your own appearance/caring for yourself; and/or inability to sleep or excessive sleeping.  Contact your physician if you are experiencing any of these symptoms.    Crisis Hotline:  · [place] Crisis Hotline:  0-203-PNWVDHN  Or 1-987.996.1006  · [place] Crisis Hotline:  1-746.507.5950  Or 595-635-7384    PREVENTING SHAKEN BABY:  If you are angry or stressed, PUT THE BABY IN THE CRIB, step away, take some deep breaths, and wait until you are calm to care for the baby.  DO NOT SHAKE THE BABY.  You are not alone, call a supporter for help.    · Crisis Call Center 24/7 crisis line 019-695-7248 or 1-181.608.6073  · You can also text them, text \"ANSWER\" to 063162    QUIT SMOKING/TOBACCO USE:  I understand the use of any tobacco products increases my chance of suffering from future heart disease and could cause other illnesses which may shorten my life. Quitting the use of tobacco products is the single most important thing I can do to improve my health. For further " information on smoking / tobacco cessation call a Toll Free Quit Line at 1-645.748.1456 (*[employer] [place]) or 1-746.520.3361 (American Lung Association) or you can access the web based program at www.lungusa.org.    · [place] Tobacco Users Help Line:  (550) 354-6235       Toll Free: 1-610.976.9838  · Quit Tobacco Program [facility] (945)919-9985    DEPRESSION / SUICIDE RISK:  As you are discharged from this [facility], it is important to learn how to keep safe from harming yourself.    Recognize the warning signs:  · Abrupt changes in personality, positive or negative- including increase in energy   · Giving away possessions  · Change in eating patterns- significant weight changes-  positive or negative  · Change in sleeping patterns- unable to sleep or sleeping all the time   · Unwillingness or inability to communicate  · Depression  · Unusual sadness, discouragement and loneliness  · Talk of wanting to die  · Neglect of personal appearance   · Rebelliousness- reckless behavior  · Withdrawal from people/activities they love  · Confusion- inability to concentrate     If you or a loved one observes any of these behaviors or has concerns about self-harm, here's what you can do:  · Talk about it- your feelings and reasons for harming yourself  · Remove any means that you might use to hurt yourself (examples: pills, rope, extension cords, firearm)  · Get professional help from the community (Mental Health, Substance Abuse, psychological counseling)  · Do not be alone:Call your Safe Contact- someone whom you trust who will be there for you.  · Call your local CRISIS HOTLINE 250-2953 or 317-879-0472  · Call your local Children's Mobile Crisis Response Team [place] (954) 792-6142 or www.Tessella  · Call the toll free National Suicide Prevention Hotlines   · National Suicide Prevention Lifeline 398-818-CSBD (3192)  · National Hope Line Network 800-SUICIDE  (808-4060)    DISCHARGE SURVEY:  Thank you for choosing [facility].  We hope we provided you with very good care.  You may be receiving a survey in the mail.  Please fill it out.  Your opinion is valuable to us.    ADDITIONAL EDUCATIONAL MATERIALS GIVEN TO PATIENT:        My signature on this form indicates that:  1.  I have reviewed and understand the above information  2.  My questions regarding this information have been answered to my satisfaction.  3.  I have formulated a plan with my discharge nurse to obtain my prescribed medication for home.

## 2017-04-30 NOTE — PROGRESS NOTES
1900 - Bedside report received from TERRENCE Rasmusesn. Patient care assumed.  2110 - Pt assessment complete, wnl. Fundus firm with minimal discharge. Pt ambulating to bathroom and voiding without difficulty. Patient denies any dizziness or lightheadedness at this time. Patient denies any calf pain or tenderness at this time. Reviewed use of emergency light. Plan of care discussed with patient for the day. Pain medication plan discussed with patient; patient requesting PRN pain medication ATC and would like to be woken up if sleeping. All questions/concerns addressed at this time. Encouraged to call with needs; call light in place, will monitor

## 2019-02-28 ENCOUNTER — OFFICE VISIT (OUTPATIENT)
Dept: URGENT CARE | Facility: PHYSICIAN GROUP | Age: 34
End: 2019-02-28
Payer: COMMERCIAL

## 2019-02-28 VITALS
BODY MASS INDEX: 39.77 KG/M2 | SYSTOLIC BLOOD PRESSURE: 114 MMHG | RESPIRATION RATE: 16 BRPM | WEIGHT: 239 LBS | HEART RATE: 86 BPM | DIASTOLIC BLOOD PRESSURE: 82 MMHG | OXYGEN SATURATION: 98 % | TEMPERATURE: 98.3 F

## 2019-02-28 DIAGNOSIS — J20.9 ACUTE BRONCHITIS, UNSPECIFIED ORGANISM: ICD-10-CM

## 2019-02-28 DIAGNOSIS — R05.9 COUGH: ICD-10-CM

## 2019-02-28 PROCEDURE — 99204 OFFICE O/P NEW MOD 45 MIN: CPT | Performed by: NURSE PRACTITIONER

## 2019-02-28 RX ORDER — ALBUTEROL SULFATE 90 UG/1
2 AEROSOL, METERED RESPIRATORY (INHALATION) EVERY 6 HOURS PRN
Qty: 8.5 G | Refills: 0 | Status: SHIPPED | OUTPATIENT
Start: 2019-02-28

## 2019-02-28 RX ORDER — BENZONATATE 100 MG/1
100 CAPSULE ORAL 3 TIMES DAILY PRN
Qty: 60 CAP | Refills: 0 | Status: SHIPPED | OUTPATIENT
Start: 2019-02-28

## 2019-02-28 RX ORDER — METHYLPREDNISOLONE 4 MG/1
4 TABLET ORAL DAILY
Qty: 1 KIT | Refills: 0 | Status: SHIPPED | OUTPATIENT
Start: 2019-02-28

## 2019-02-28 ASSESSMENT — ENCOUNTER SYMPTOMS
EYE PAIN: 0
WHEEZING: 0
VOMITING: 0
COUGH: 1
MYALGIAS: 0
RHINORRHEA: 1
FEVER: 0
SHORTNESS OF BREATH: 0
DIZZINESS: 0
CHILLS: 0
SORE THROAT: 0
NAUSEA: 0

## 2019-02-28 NOTE — PROGRESS NOTES
Subjective:     Amita Jacome is a 34 y.o. female who presents for Nasal Congestion (x 2 weeks ) and Shortness of Breath (x 3 days )       Cough   This is a new problem. Episode onset: 3 days. The problem has been unchanged. The problem occurs constantly. The cough is non-productive. Associated symptoms include nasal congestion, postnasal drip and rhinorrhea. Pertinent negatives include no chest pain, chills, fever, myalgias, rash, sore throat, shortness of breath or wheezing. Nothing aggravates the symptoms. She has tried OTC cough suppressant for the symptoms. The treatment provided no relief. Her past medical history is significant for bronchitis.   History reviewed. No pertinent past medical history.  Past Surgical History:   Procedure Laterality Date   • REPEAT C SECTION  2017    Procedure: REPEAT C SECTION;  Surgeon: Robert Lindsey M.D.;  Location: LABOR AND DELIVERY;  Service:    • TALIA BY LAPAROSCOPY      Cholecystectomy, Laparoscopic   • PRIMARY C SECTION           Social History     Social History   • Marital status:      Spouse name: N/A   • Number of children: N/A   • Years of education: N/A     Occupational History   • Not on file.     Social History Main Topics   • Smoking status: Never Smoker   • Smokeless tobacco: Never Used   • Alcohol use No   • Drug use: No   • Sexual activity: Not on file     Other Topics Concern   • Not on file     Social History Narrative   • No narrative on file    History reviewed. No pertinent family history. Review of Systems   Constitutional: Negative for chills and fever.   HENT: Positive for congestion, postnasal drip and rhinorrhea. Negative for sore throat.    Eyes: Negative for pain.   Respiratory: Positive for cough. Negative for shortness of breath and wheezing.    Cardiovascular: Negative for chest pain.   Gastrointestinal: Negative for nausea and vomiting.   Genitourinary: Negative for hematuria.   Musculoskeletal:  Negative for myalgias.   Skin: Negative for rash.   Neurological: Negative for dizziness.     Allergies   Allergen Reactions   • [place] Anaphylaxis      Objective:   /82   Pulse 86   Temp 36.8 °C (98.3 °F) (Temporal)   Resp 16   Wt 108.4 kg (239 lb)   SpO2 98%   BMI 39.77 kg/m²   Physical Exam   Constitutional: She is oriented to person, place, and time. She appears well-developed and well-nourished. No distress.   HENT:   Head: Normocephalic and atraumatic.   Right Ear: Tympanic membrane normal.   Left Ear: Tympanic membrane normal.   Nose: Nose normal. Right sinus exhibits no maxillary sinus tenderness and no frontal sinus tenderness. Left sinus exhibits no maxillary sinus tenderness and no frontal sinus tenderness.   Mouth/Throat: Uvula is midline, oropharynx is clear and moist and mucous membranes are normal. No posterior oropharyngeal edema, posterior oropharyngeal erythema or tonsillar abscesses. No tonsillar exudate.   Eyes: Pupils are equal, round, and reactive to light. Conjunctivae and EOM are normal. Right eye exhibits no discharge. Left eye exhibits no discharge.   Cardiovascular: Normal rate and regular rhythm.    No murmur heard.  Pulmonary/Chest: Effort normal. No respiratory distress. She has no decreased breath sounds. She has wheezes. She has no rhonchi. She has no rales.   Dry cough noted throughout exam   Abdominal: Soft. She exhibits no distension. There is no tenderness.   Neurological: She is alert and oriented to person, place, and time. She has normal reflexes. No sensory deficit.   Skin: Skin is warm, dry and intact.   Psychiatric: She has a normal mood and affect.         Assessment/Plan:   Assessment    1. Acute bronchitis, unspecified organism  benzonatate (TESSALON) 100 MG Cap    MethylPREDNISolone (MEDROL DOSEPAK) 4 MG Tablet Therapy Pack    albuterol 108 (90 Base) MCG/ACT Aero Soln inhalation aerosol   2. Cough         Patient without fever, vital signs stable, no signs of  bacterial etiology.  Anabiotic's not indicated at this time.  Will trial steroid and albuterol inhaler with cough medication.  Advised to continue supportive care with Tylenol and/or ibuprofen for fevers and discomfort. Increased fluids and electrolytes.  Patient given precautionary s/sx that mandate immediate follow up and evaluation in the ED. Advised of risks of not doing so.    DDX, Supportive care, and indications for immediate follow-up discussed with patient.    Instructed to return to clinic or nearest emergency department if we are not available for any change in condition, further concerns, or worsening of symptoms.    The patient demonstrated a good understanding and agreed with the treatment plan.

## 2019-07-11 ENCOUNTER — HOSPITAL ENCOUNTER (OUTPATIENT)
Dept: RADIOLOGY | Facility: MEDICAL CENTER | Age: 34
End: 2019-07-11
Attending: OBSTETRICS & GYNECOLOGY
Payer: COMMERCIAL

## 2019-07-11 DIAGNOSIS — N93.8 DYSFUNCTIONAL UTERINE BLEEDING: ICD-10-CM

## 2019-07-11 PROCEDURE — 76830 TRANSVAGINAL US NON-OB: CPT

## 2019-11-15 ENCOUNTER — OFFICE VISIT (OUTPATIENT)
Dept: URGENT CARE | Facility: PHYSICIAN GROUP | Age: 34
End: 2019-11-15
Payer: COMMERCIAL

## 2019-11-15 VITALS
SYSTOLIC BLOOD PRESSURE: 122 MMHG | RESPIRATION RATE: 18 BRPM | DIASTOLIC BLOOD PRESSURE: 80 MMHG | OXYGEN SATURATION: 97 % | HEART RATE: 112 BPM | TEMPERATURE: 98.5 F | WEIGHT: 241 LBS | HEIGHT: 65 IN | BODY MASS INDEX: 40.15 KG/M2

## 2019-11-15 DIAGNOSIS — J22 LRTI (LOWER RESPIRATORY TRACT INFECTION): ICD-10-CM

## 2019-11-15 PROCEDURE — 99214 OFFICE O/P EST MOD 30 MIN: CPT | Performed by: PHYSICIAN ASSISTANT

## 2019-11-15 RX ORDER — AZITHROMYCIN 250 MG/1
TABLET, FILM COATED ORAL
Qty: 6 TAB | Refills: 0 | Status: SHIPPED | OUTPATIENT
Start: 2019-11-15

## 2019-11-15 RX ORDER — CODEINE PHOSPHATE AND GUAIFENESIN 10; 100 MG/5ML; MG/5ML
5 SOLUTION ORAL EVERY 6 HOURS PRN
Qty: 100 ML | Refills: 0 | Status: SHIPPED | OUTPATIENT
Start: 2019-11-15 | End: 2019-11-20

## 2019-11-15 ASSESSMENT — ENCOUNTER SYMPTOMS
ABDOMINAL PAIN: 0
SINUS PAIN: 1
VOMITING: 0
PALPITATIONS: 0
MYALGIAS: 1
CHILLS: 0
HEADACHES: 0
SORE THROAT: 0
NAUSEA: 0
PND: 0
RHINORRHEA: 1
SPUTUM PRODUCTION: 1
FOCAL WEAKNESS: 0
WHEEZING: 1
HEMOPTYSIS: 0
COUGH: 1
TINGLING: 0
SENSORY CHANGE: 0
FEVER: 0
SHORTNESS OF BREATH: 1

## 2019-11-15 ASSESSMENT — COPD QUESTIONNAIRES: COPD: 0

## 2019-11-15 NOTE — PROGRESS NOTES
Subjective:      Amita Jacome is a 34 y.o. female who presents with Cough (x 1 1/2 weeks worse the last 2 days)            Cough   This is a new problem. Episode onset: 1.5 weeks  The problem has been gradually worsening. The cough is productive of purulent sputum. Associated symptoms include myalgias, nasal congestion, rhinorrhea, shortness of breath and wheezing. Pertinent negatives include no chest pain, chills, ear congestion, ear pain, fever, headaches, hemoptysis, postnasal drip, rash or sore throat. The symptoms are aggravated by lying down. She has tried OTC cough suppressant (Theraflu) for the symptoms. The treatment provided mild relief. There is no history of asthma, bronchitis, COPD or pneumonia.         No past medical history on file.    Past Surgical History:   Procedure Laterality Date   • REPEAT C SECTION  2017    Procedure: REPEAT C SECTION;  Surgeon: Robert Lindsey M.D.;  Location: [facility];  Service:    • TALIA BY LAPAROSCOPY      Cholecystectomy, Laparoscopic   • PRIMARY C SECTION             No family history on file.    Allergies   Allergen Reactions   • [place] Anaphylaxis       Medications, Allergies, and current problem list reviewed today in Epic    Review of Systems   Constitutional: Negative for chills, fever and malaise/fatigue.   HENT: Positive for congestion, rhinorrhea and sinus pain. Negative for ear discharge, ear pain, postnasal drip and sore throat.    Respiratory: Positive for cough, sputum production, shortness of breath and wheezing. Negative for hemoptysis.    Cardiovascular: Negative for chest pain, palpitations, leg swelling and PND.   Gastrointestinal: Negative for abdominal pain, nausea and vomiting.   Musculoskeletal: Positive for myalgias.   Skin: Negative for rash.   Neurological: Negative for tingling, sensory change, focal weakness and headaches.     All other systems reviewed and are negative.        Objective:     BP  "122/80   Pulse (!) 112   Temp 36.9 °C (98.5 °F) (Temporal)   Resp 18   Ht 1.651 m (5' 5\")   Wt 109.3 kg (241 lb)   SpO2 97%   BMI 40.10 kg/m²      Physical Exam  Constitutional:       General: She is not in acute distress.     Appearance: Normal appearance.   HENT:      Head: Normocephalic and atraumatic.      Right Ear: Tympanic membrane, ear canal and external ear normal.      Left Ear: Tympanic membrane, ear canal and external ear normal.      Nose: Congestion and rhinorrhea present.      Mouth/Throat:      Mouth: Mucous membranes are moist.      Pharynx: No posterior oropharyngeal erythema.   Eyes:      Conjunctiva/sclera: Conjunctivae normal.   Neck:      Musculoskeletal: Normal range of motion and neck supple.   Cardiovascular:      Rate and Rhythm: Regular rhythm. Tachycardia present.      Heart sounds: Normal heart sounds. No murmur. No friction rub. No gallop.    Pulmonary:      Effort: Pulmonary effort is normal. No respiratory distress.      Breath sounds: No wheezing, rhonchi or rales.      Comments: Deep cough  Lymphadenopathy:      Cervical: No cervical adenopathy.   Skin:     General: Skin is warm and dry.      Findings: No rash.   Neurological:      General: No focal deficit present.      Mental Status: She is alert and oriented to person, place, and time.   Psychiatric:         Mood and Affect: Mood normal.         Behavior: Behavior normal.         Thought Content: Thought content normal.         Judgment: Judgment normal.                 Assessment/Plan:     1. LRTI (lower respiratory tract infection)  azithromycin (ZITHROMAX) 250 MG Tab    guaifenesin-codeine (ROBITUSSIN AC) Solution oral solution         Current Outpatient Medications:   •  azithromycin (ZITHROMAX) 250 MG Tab, Take as directed on package. 1 pack., Disp: 6 Tab, Rfl: 0    •  guaifenesin-codeine (ROBITUSSIN AC) Solution oral solution, Take 5 mL by mouth every 6 hours as needed for Cough for up to 5 days., Disp: 100 mL, Rfl: " 0  Sedation side effects discussed. No Driving or alcohol with medication given.       Differential diagnoses, Supportive care, and indications for immediate follow-up discussed with patient.   Instructed to return to clinic or nearest emergency department for any change in condition, further concerns, or worsening of symptoms.    The patient demonstrated a good understanding and agreed with the treatment plan.    Sophia Curtis P.A.-C.

## (undated) DEVICE — HEAD HOLDER JUNIOR/ADULT

## (undated) DEVICE — SUTURE 2-0 VICRYL PLUS CT-1 36 (36PK/BX)"

## (undated) DEVICE — SWABSTICK BENZOIN SINGLE (50EA/BX)

## (undated) DEVICE — TUBING CLEARLINK DUO-VENT - C-FLO (48EA/CA)

## (undated) DEVICE — WATER IRRIG. STER. 1500 ML - (9/CA)

## (undated) DEVICE — SUTURE 4-0 VICRYL PLUSFS-1 - 27 INCH (36/BX)

## (undated) DEVICE — SODIUM CHL IRRIGATION 0.9% 1000ML (12EA/CA)

## (undated) DEVICE — RETRACTOR O C SECTION LRY - (5/BX)

## (undated) DEVICE — TRAY SPINAL ANESTHESIA NON-SAFETY (10/CA)

## (undated) DEVICE — SUTURE 0 VICRYL PLUS CT-1 - 36 INCH (36/BX)

## (undated) DEVICE — PACK C-SECTION (2EA/CA)

## (undated) DEVICE — DETERGENT RENUZYME PLUS 10 OZ PACKET (50/BX)

## (undated) DEVICE — SUTURE3-0 36IN VCRLY PLS ANTI (36PK/BX)

## (undated) DEVICE — SUTURE 0 36 CHROMIC GUT CTX (36PK/BX)

## (undated) DEVICE — SET EXTENSION WITH 2 PORTS (48EA/CA) ***PART #2C8610 IS A SUBSTITUTE*****

## (undated) DEVICE — SUTURE 0 CHROMIC CT-1 - (36/BX)

## (undated) DEVICE — KIT  I.V. START (100EA/CA)

## (undated) DEVICE — ELECTRODE DUAL RETURN W/ CORD - (50/PK)

## (undated) DEVICE — CLOSURE SKIN STRIP 1/2 X 4 IN - (STERI STRIP) (50/BX 4BX/CA)

## (undated) DEVICE — CATHETER IV NON-SAFETY 18 GA X 1 1/4 (50/BX 4BX/CA)